# Patient Record
(demographics unavailable — no encounter records)

---

## 2024-11-12 NOTE — HISTORY OF PRESENT ILLNESS
[Current] : Current [TextBox_13] :  Patient is scheduled for a base line LDCT for lung cancer screening. Shared decision making managed and documented by Dr. Kelvin shane Multiple attempts to reach the patient were unsuccessful. Chart review performed to confirm eligibility for LDCT.  No documented personal or family history of lung cancer. No documented s/s of lung cancer. current smoker with 20 PY  [PacksperYear] : 20

## 2024-11-12 NOTE — PLAN
[FreeTextEntry1] : Plan: -Patient is eligible for LDCT based on documented information obtained from chart review; SDM done by ordering provider and documented  -Low Dose CT chest for lung cancer screening to be completed  -Patient to follow up with  Dr. Kelvin shane

## 2024-11-19 NOTE — HISTORY OF PRESENT ILLNESS
[FreeTextEntry1] : Name GRETCHEN BEYER MRN [id number]  Aug 13 1966 ------------------------------------------------------------------------------------------------------------------------------------------- Date of First Visit: 11/15/24 PCP: Dr. Kelvin Monroy ------------------------------------------------------------------------------------------------------------------------------------------- CC: kidney stones   History of Present Illness: GRETCHEN BEYER is a 58-year-old [facility] Crohn's, GERD who presents for evaluation of kidney stones. He was previously seen by a  partner shortly after passing a kidney stone. US at that time (May 2024) showed multiple echogenic foci: Right upper pole cluster - 5.7 mm; Right lower pole - 3.8 mm; left upper pole - 4.5 mm; dilate left upper pole calcyes. Did not complete 24HU test ordered. Admits to poor quality diet including limited veggies and poor fluid intake. US today RLP 3.5 mm, LLP 5.4 mm, LLP 4.7 mm. Relatively stable with exception of right upper pole focus not seen today. Denies any other stone passage since last visit. Left hydronephrosis/caliectasis has resolved.   Kidney Stone History: First-time stone former - no Concurrent asymptomatic stone(s) - yes; 1 mm b/l LP Previous stone surgeries - no Previous passed stones - yes, x3-4 Comorbidities - non-contributory Family history of kidney stones - no Previous metabolic evaluation - no

## 2024-11-19 NOTE — HISTORY OF PRESENT ILLNESS
[FreeTextEntry1] : Name GRETCHEN BEYER MRN [id number]  Aug 13 1966 ------------------------------------------------------------------------------------------------------------------------------------------- Date of First Visit: 11/15/24 PCP: Dr. Kelvin Monroy ------------------------------------------------------------------------------------------------------------------------------------------- CC: kidney stones   History of Present Illness: GRETCHEN BEYER is a 58-year-old [facility] Crohn's, GERD who presents for evaluation of kidney stones. He was previously seen by a  partner shortly after passing a kidney stone. US at that time (May 2024) showed multiple echogenic foci: Right upper pole cluster - 5.7 mm; Right lower pole - 3.8 mm; left upper pole - 4.5 mm; dilate left upper pole calcyes. Did not complete 24HU test ordered. Admits to poor quality diet including limited veggies and poor fluid intake. US today RLP 3.5 mm, LLP 5.4 mm, LLP 4.7 mm. Relatively stable with exception of right upper pole focus not seen today. Denies any other stone passage since last visit. Left hydronephrosis/caliectasis has resolved.   Kidney Stone History: First-time stone former - no Concurrent asymptomatic stone(s) - yes; 1 mm b/l LP Previous stone surgeries - no Previous passed stones - yes, x3-4 Comorbidities - non-contributory Family history of kidney stones - no Previous metabolic evaluation - no

## 2024-11-19 NOTE — ASSESSMENT
[FreeTextEntry1] : Assessment:  GRETCHEN BEYER is a 58 year M with Crohn's and nephrolithiasis. He currently has multiple non-obstructing stones bilaterally.  Generalized stone prevention counseling was provided as follows:     1. High fluid intake. The goal should be to drink enough to produce 2.5 liters (quarts) of urine daily. Most studies have shown that high fluid volume intake will decrease the risk of stone formation. Research generally indicates that there appears to be little difference between hard and soft water in the formation of kidney stones. Lemon juice added to the water may also aid with increasing urine pH, urine citric acid and reducing stone formation.      2. Dietary recommendations. The key to all dietary recommendations is moderation.      Decrease animal protein consumption. High protein intake increases urinary calcium, oxalate, and uric acid excretion into the urine - all of which will increase the probability of stone formation.   Decrease sodium intake by avoiding heavily salted foods, and resist adding salt to food at the table. Sodium restriction is widely recognized as an important element of dietary prevention of recurrent kidney stones.    Dietary calcium.  Patient should consume a daily recommended allowance of dietary calcium (800-1200 mg). Patients who take in too much Ca or too little Ca are at an increased risk of recurrent stone formation. Dietary calcium is preferable to supplements. Calcium supplementation can be safe when the calcium is taken with meals, rather than at bedtime. Another suggestion for patients who have been recommended to take calcium supplements for their bone health is to consider using calcium citrate, an over-the-counter preparation that provides 950 mg of calcium citrate and 200 mg of elemental calcium in each tablet.   Avoid excess intake of foods with high oxalate content, including dark leafy greens, beets, nuts, tea, coffee, and chocolate. Strict avoidance of oxalate is not necessary in most cases.   Avoid high doses of vitamin C. Intake should be limited to a maximum daily dose of less than 2 gm (2,000 mg).   Plan:   -Follow-up in 6 months for RBUS  -[facility] re-ordered - TEB after

## 2025-01-05 NOTE — PHYSICAL EXAM
[Sclera] : the sclera and conjunctiva were normal [Normal Appearance] : the appearance of the neck was normal [Abdomen Tenderness] : non-tender [Normal Color / Pigmentation] : normal skin color and pigmentation [Normal] : oriented to person, place, and time [de-identified] : no submandibular LAD

## 2025-01-05 NOTE — PHYSICAL EXAM
[Sclera] : the sclera and conjunctiva were normal [Normal Appearance] : the appearance of the neck was normal [Abdomen Tenderness] : non-tender [Normal Color / Pigmentation] : normal skin color and pigmentation [Normal] : oriented to person, place, and time [de-identified] : no submandibular LAD

## 2025-01-05 NOTE — ASSESSMENT
[FreeTextEntry1] : Mr. GRETCHEN BEYER is a 58 year old male with a MHx of HTN, stricturing ileocolonic Crohn's disease s/p ileocolonic resection with anastomotic stricture (diagnosed 2008, currently taking IFX 10mg/kg q8 weeks, previously ADA and CTZ) presenting for follow up after colonoscopy.  Stricturing ileocolonic Crohn's disease s/p ileocolonic resection with anastomotic stricture -diagnosed in 2008, previously tx'd ADA and CTZ, currently on IFX 10mg/kg q8, course has been complicated by anastomotic requiring endoscopic dilations, as of last colonoscopy on 12/2024 unable to traverse stricture s/p dilation and with active inflammation and patient with symptoms of obstruction in November and December -for stricture - will refer to registered dietician, Angélica Castañeda RD, Also discussed in detail the efficacy and r/a of steroid injection, needle knife, and reoperation.  -discussed GIE-balloon study which patient was interested in pursuing and plan for colonoscopy in 3-6 months -for active inflammation in descending colon and sigmoid colon, will increase IFX 10mg/kg to q4w and obtain IFX-level and Ab at next infusion; discussed with patient that if CRP, IFX-Ab, and next colonoscopy show IFX is inadequate will have to consider alternative MOA   IBD Healthcare Maintenance -patient seen by registered dietician, Angélica Castañeda RD  RTC: post-procedure   MD Darinel Guadalupe IBD Fellow [facility]   Discussed with Dr Kimbrough

## 2025-01-05 NOTE — HISTORY OF PRESENT ILLNESS
[FreeTextEntry1] : Mr. GRETCHEN BEYER is a 58 year old male with a MHx of HTN, stricturing ileocolonic Crohn's disease s/p ileocolonic resection with anastomotic stricture (diagnosed 2008, currently taking IFX 10mg/kg q8 weeks, previously ADA and CTZ, status post ileocolonic resection by Dr. Malone at [facility]) presenting for follow up after colonoscopy.  Prior IBD-Hx: started on Humira EOW it worked well for 2-3 years then developed secondary non-response 2/2 Ab, was switched to cimizia which he took for another 2 years but was hospitalized for abdominal pain in Aug 2021 at [place], CT showed ileitis and partial SBO that was treated with steoids and was given a dose of IFX and stated on maintenance [id number]. MRI 8/2021: inflammation involving the britany-TI and distal ileum and mildly dilated SB loops   10/4/2022 - colonoscopy - colon was mildly inflamed with erythema and few apthae affecting 25% of the SA, without overt ulcers/strictures. There were scattered psuedopolyps and the largest (0.8cm and 1cm in ascending and descending colon) were snared. A side to side ileocolonic anastomosis was identified, but ulceration and stricture prevented intubation to evaluate neoterminal ileum  10/2022 - GI-visit - given inflammation seen on recent cscope, elevated CRP, and undetectable drug level of IFX, will inc dose to 10mg/kg (which started in 11/2022)  7/2023 - GI-visit - IC anastomotic stricture- doesn't complain of obstructive sx at this time, can consider elective endoscopic dilation; CTE to plan  11/2023 - GI-visit - CTE to evaluate length of anastomotic stricture. If short stricture length that is amenable to endoscopic intervention (balloon dilation), will plan to attempt dilation during next colonoscopy. If stricture length too long to be efficaciously treated with balloon dilation, will send surgical referral. Colonoscopy to be scheduled after CTE completed to evaluate for endoscopic healing now that patient is on increased IFX dose  3/2024 - CTE - Short segment stricture at the ileocolic anastomosis with postsurgical changes from partial right hemicolectomy. Findings of diffuse underlying chronic inflammatory large bowel disease.  6/18/24 - colonoscopy - single sessile 10 mm bleeding polyp of benign appearance was found in the descending colon. A single-piece polypectomy was performed using a cold snare. Site of ileocolonic anastomosis noted with visible suture attached to small fecolith, limiting full visualization of stricture. Moved fecolith away from site of stricture. Advanced soft tip guidewire into pinhole stricture which was situated above site of suture under fluoroscopy guidance. Subsequently advanced CRE balloon past stricture with graduated dilatation from 10 mm to 12 mm with good rent noted. Using Ensizor Flexible Endoscopic Scissors, cut loose suture material to allow for better visualization of dilated stricture. Still appeared to be narrowed following dilatation..	 Noted to have 7 mm pseudopolyp adjacent resected descending polyp (not removed) as well as an additional 6 mm pseudopolyp in the sigmoid colon (not resected). Remainder of colonic mucosa with no e/o inflammation.Large perianal skin tags noted on rectal exam. Plan - Continue with IFX 10 mg/kg q8 weeks  7/2024 - GI-visit - discussed recent colonoscopy in detail, patient has chronic stricture and will likely need stepwise balloon dilations then next step could be GIE Medical randomized clinical trial or surgical procedure for anastomotic revision  12/2024 - colonoscopy - stricture of benign intrinsic appearance was noted in the ileocecal valve. The scope could not traverse the lesion and the exam could not be completed. Stricture with active inflammatory component. A 10 mm stepwise wire guided balloon was introduced for dilation and the diameter was progressively increased to 11 mm.There was nice rent in the stricture and further dilation was avoided. The post dilation state did not allow passage of the colonoscope, Segmental continuous erythema, edema, aphthae with no bleeding was noted in the descending colon and sigmoid colon. These findings are compatible with Crohn's Disease, single sessile non-bleeding polyp of benign appearance was found in the descending colon. Path - R. colon polyp - Colonic mucosa showing active chronic inflammation with cryptitis, reactive lymphoid aggregate, and regenerative epithelial changes, consistent with inflammatory polyp. Plan - Patient with active inflammation in IC stricture as well as descending and sigmoid colon represented by aphthae, edema, and erythema. Will need to hold further discussion with patient regarding optimization of advanced therapy  Today: Since last visit - he has had 2 episodes of wave like abdominal pain. The first occurred around Thanksgiving. He thought it could be related to onions but he had progressively worsening abdominal pain for a couple days with an episode of vomiting and then he had explosive diarrhea. This resolved without any intervention. He had an exactly similar episode in 5 days ago but without the vomiting. He eats whatever he wants. He does not eat vegetables. He eats apple with skin but infrequently but does not have any issues eating apples. He does vacillate between days of 1 BM and 10 BMs but this does not impact his quality of life.

## 2025-01-05 NOTE — ASSESSMENT
[FreeTextEntry1] : Mr. GRETCHEN BEYER is a 58 year old male with a MHx of HTN, stricturing ileocolonic Crohn's disease s/p ileocolonic resection with anastomotic stricture (diagnosed 2008, currently taking IFX 10mg/kg q8 weeks, previously ADA and CTZ) presenting for follow up after colonoscopy.  Stricturing ileocolonic Crohn's disease s/p ileocolonic resection with anastomotic stricture -diagnosed in 2008, previously tx'd ADA and CTZ, currently on IFX 10mg/kg q8, course has been complicated by anastomotic requiring endoscopic dilations, as of last colonoscopy on 12/2024 unable to traverse stricture s/p dilation and with active inflammation and patient with symptoms of obstruction in November and December -for stricture - will refer to registered dietician, Angélica Castañeda RD, Also discussed in detail the efficacy and r/a of steroid injection, needle knife, and reoperation.  -discussed GIE-balloon study which patient was interested in pursuing and plan for colonoscopy in 3-6 months -for active inflammation in descending colon and sigmoid colon, will increase IFX 10mg/kg to q4w and obtain IFX-level and Ab at next infusion; discussed with patient that if CRP, IFX-Ab, and next colonoscopy show IFX is inadequate will have to consider alternative MOA   IBD Healthcare Maintenance -patient seen by registered dietician, Angélica Castañeda RD  RTC: post-procedure   MD Darinel Guadalupe IBD Fellow [facility]   Discussed with Dr Kimbrough

## 2025-07-02 NOTE — ASSESSMENT
[FreeTextEntry1] : 58M with symptomatic stricture proximal to the side to side stapled ileocolic anastomosis Also with elongated blind end c/w non functional anastomosis Discussed case with Dr Kimbrough Had 2 previous dilations, but only able to dilated to 11mm Now, with food restrictions and sporadic SBOs Has not had cross sectional imaging in over a year will order an MRE Discussed that the only way to fix the stricture & the non functional anastomosis would be an operation If he did not want that, he can continue to have dilations but they would likely only be a temporary relief Will have a virtual visit after the MRE All questions answered Discussed operative technique and expected LOS

## 2025-07-02 NOTE — HISTORY OF PRESENT ILLNESS
[FreeTextEntry1] : 58M with a long history of ileocolic Crohn's disease Had an ileocolic resection by Dr John Malone at [facility] approx 15 years ago. Had a stapled side to side ileocolic anastomosis, laparoscopically.  Did well initially but then developed abdominal pain with mild obstructions within the past 3 years. 1st obstruction was 3 years ago and resulted in a hospital admission. Since then, his SBOs are managed at home with NPO for a couple of days. His Remicade has been increased until q4 weeks but still feels that these SBOs are occuring. Has been scoped and dilated up to 11mm. Had relief temporarily each time. CT reviewed- seems to be evidence of non functional anastomosis in addition to his 4cm stricture proximal to the anastomosis

## 2025-07-02 NOTE — PHYSICAL EXAM
[JVD] : no jugular venous distention  [Respiratory Effort] : normal respiratory effort [No Rash or Lesion] : No rash or lesion [Alert] : alert [Oriented to Person] : oriented to person [Oriented to Place] : oriented to place [Oriented to Time] : oriented to time [Calm] : calm [de-identified] : small laparoscopic scars, no hernia [de-identified] : NAD [de-identified] : +EOMI [de-identified] : full ROM